# Patient Record
Sex: MALE | Race: WHITE | NOT HISPANIC OR LATINO | Employment: FULL TIME | URBAN - METROPOLITAN AREA
[De-identification: names, ages, dates, MRNs, and addresses within clinical notes are randomized per-mention and may not be internally consistent; named-entity substitution may affect disease eponyms.]

---

## 2017-10-12 ENCOUNTER — TRANSCRIBE ORDERS (OUTPATIENT)
Dept: ADMINISTRATIVE | Facility: HOSPITAL | Age: 37
End: 2017-10-12

## 2017-11-06 ENCOUNTER — LAB (OUTPATIENT)
Dept: LAB | Facility: HOSPITAL | Age: 37
End: 2017-11-06
Payer: COMMERCIAL

## 2017-11-06 ENCOUNTER — TRANSCRIBE ORDERS (OUTPATIENT)
Dept: LAB | Facility: HOSPITAL | Age: 37
End: 2017-11-06

## 2017-11-06 DIAGNOSIS — N46.9 MALE INFERTILITY: ICD-10-CM

## 2017-11-06 DIAGNOSIS — N46.9 MALE INFERTILITY: Primary | ICD-10-CM

## 2017-11-06 LAB
B ABORTUS AB SMN QL AGGL: SLIGHT
COLLECTION DATE & TIME: NORMAL
LIQUEFACTION TIME SMN: 20 MIN
PH SMN: 8.1 [PH] (ref 7.2–8.6)
SEX ABSTIN DURATION TIME PATIENT: NORMAL D
SPECIMEN VOL SMN: 2.4 ML (ref 1–5)
SPERM # SMN: 804 MILLION/EJACULATION (ref 40–1000)
SPERM AMORPHOUS HEAD NFR SMN: 1 %
SPERM MORPH PNL SMN: 20
SPERM MOTILE SMN QL MICRO: 85 %
SPERM MOTILITY SCORE SMN AUTO: 3 (ref 2–4)
SPERM PROG NFR SMN: 4 % (ref 2–4)
SPERM SMN: 0 %
SPERM SMN: 1 %
SPERM SMN: 18 % (ref 0–50)
SPERM SMN: 2 %
SPERM SMN: 3 %
SPERM SMN: 3 %
SPERM SMN: 335 /ML (ref 20–999)
SPERM SMN: 8 %
SPERM SMN: 82 % (ref 50–100)
VISC SMN: 4 CP (ref 3–4)
WBC SMN QL: <1 "HPF"

## 2017-11-06 PROCEDURE — 89320 SEMEN ANAL VOL/COUNT/MOT: CPT

## 2019-02-26 ENCOUNTER — TRANSCRIBE ORDERS (OUTPATIENT)
Dept: ADMINISTRATIVE | Facility: HOSPITAL | Age: 39
End: 2019-02-26

## 2019-02-26 DIAGNOSIS — J32.9 CHRONIC SINUSITIS, UNSPECIFIED LOCATION: Primary | ICD-10-CM

## 2019-03-06 ENCOUNTER — HOSPITAL ENCOUNTER (OUTPATIENT)
Dept: CT IMAGING | Facility: HOSPITAL | Age: 39
Discharge: HOME | End: 2019-03-06
Attending: OTOLARYNGOLOGY
Payer: COMMERCIAL

## 2019-03-06 DIAGNOSIS — J32.9 CHRONIC SINUSITIS, UNSPECIFIED LOCATION: ICD-10-CM

## 2019-03-06 PROCEDURE — 70486 CT MAXILLOFACIAL W/O DYE: CPT

## 2019-06-17 ENCOUNTER — APPOINTMENT (OUTPATIENT)
Dept: LAB | Facility: HOSPITAL | Age: 39
End: 2019-06-17
Attending: OTOLARYNGOLOGY
Payer: COMMERCIAL

## 2019-06-19 PROCEDURE — 88304 TISSUE EXAM BY PATHOLOGIST: CPT | Performed by: PATHOLOGY

## 2019-06-20 ENCOUNTER — LAB REQUISITION (OUTPATIENT)
Dept: LAB | Facility: HOSPITAL | Age: 39
End: 2019-06-20
Payer: COMMERCIAL

## 2019-06-20 DIAGNOSIS — J32.4 CHRONIC PANSINUSITIS: ICD-10-CM

## 2019-07-24 ENCOUNTER — TRANSCRIBE ORDERS (OUTPATIENT)
Dept: ADMINISTRATIVE | Facility: HOSPITAL | Age: 39
End: 2019-07-24

## 2019-07-24 DIAGNOSIS — R07.2 PRECORDIAL PAIN: Primary | ICD-10-CM

## 2019-07-31 ENCOUNTER — HOSPITAL ENCOUNTER (OUTPATIENT)
Dept: NON INVASIVE DIAGNOSTICS | Facility: CLINIC | Age: 39
Discharge: HOME | End: 2019-07-31
Payer: COMMERCIAL

## 2019-07-31 DIAGNOSIS — R07.2 PRECORDIAL PAIN: ICD-10-CM

## 2019-07-31 PROCEDURE — 93017 CV STRESS TEST TRACING ONLY: CPT

## 2019-08-01 PROCEDURE — 93016 CV STRESS TEST SUPVJ ONLY: CPT | Performed by: INTERNAL MEDICINE

## 2019-08-01 PROCEDURE — 93018 CV STRESS TEST I&R ONLY: CPT | Performed by: INTERNAL MEDICINE

## 2021-09-21 ENCOUNTER — APPOINTMENT (OUTPATIENT)
Dept: RADIOLOGY | Facility: CLINIC | Age: 41
End: 2021-09-21
Payer: COMMERCIAL

## 2021-09-21 VITALS
DIASTOLIC BLOOD PRESSURE: 84 MMHG | BODY MASS INDEX: 35.31 KG/M2 | SYSTOLIC BLOOD PRESSURE: 128 MMHG | HEIGHT: 75 IN | WEIGHT: 284 LBS

## 2021-09-21 DIAGNOSIS — M22.42 CHONDROMALACIA OF LEFT PATELLA: Primary | ICD-10-CM

## 2021-09-21 DIAGNOSIS — M25.562 LEFT KNEE PAIN, UNSPECIFIED CHRONICITY: ICD-10-CM

## 2021-09-21 PROCEDURE — 73564 X-RAY EXAM KNEE 4 OR MORE: CPT

## 2021-09-21 PROCEDURE — 99203 OFFICE O/P NEW LOW 30 MIN: CPT | Performed by: ORTHOPAEDIC SURGERY

## 2021-09-21 NOTE — ASSESSMENT & PLAN NOTE
Findings consistent with patellofemoral pain syndrome  Imaging and prognosis reviewed with patient  Discussed with patient he has mild arthritic changes under knee cap which are causing his symptoms, pain, popping, grinding  At this time no cortisone injection warranted  His main goal is to control the symptoms from worsening, avoid high impact activities, repetitive stairs, kneeling, crawling  Stationary bike, elliptical, pool recommended for low impact exercises  He can use OTC nsaids as needed for pain, OTC voltaren gel, joint supplements  If symptoms worsen call and we can offer cortisone injection, also discussed gel injections  All patient's questions were answered to his satisfaction  This note is created using dictation transcription  It may contain typographical errors, grammatical errors, improperly dictated words, background noise and other errors

## 2021-09-21 NOTE — PROGRESS NOTES
Assessment:     1  Chondromalacia of left patella        Plan:     Problem List Items Addressed This Visit        Musculoskeletal and Integument    Chondromalacia of left patella - Primary     Findings consistent with patellofemoral pain syndrome  Imaging and prognosis reviewed with patient  Discussed with patient he has mild arthritic changes under knee cap which are causing his symptoms, pain, popping, grinding  At this time no cortisone injection warranted  His main goal is to control the symptoms from worsening, avoid high impact activities, repetitive stairs, kneeling, crawling  Stationary bike, elliptical, pool recommended for low impact exercises  He can use OTC nsaids as needed for pain, OTC voltaren gel, joint supplements  If symptoms worsen call and we can offer cortisone injection, also discussed gel injections  All patient's questions were answered to his satisfaction  This note is created using dictation transcription  It may contain typographical errors, grammatical errors, improperly dictated words, background noise and other errors  Relevant Orders    XR knee 4+ vw left injury          Subjective:     Patient ID: Ritika Schneider is a 42 y o  male  Chief Complaint:  42 yr old male in for evaluation of left knee pain  History MCL injury 1998 and rehabbed it with no recurrent issues  For past 10-15 yrs on and off pain anteromedial  Started going up stairs, seen at [facility] and prescribed physical therapy, nsaids for pain  He didn't find therapy very beneficial  Now patient has pain going up and down stairs, start up pain, even prolonged walking or standing bothers him  General dull ache most days, has sense of knee giving out at times, denies locking or michael instability  Popping, grinding noted in knee  Use ibuprofen as needed  No new injury or trauma       Allergy:  No Known Allergies  Medications:  all current active meds have been reviewed  Past Medical History:  Past Medical History: Diagnosis Date    Dental disease      Past Surgical History:  Past Surgical History:   Procedure Laterality Date    ANKLE SURGERY Left 2005    EYE SURGERY       Family History:  Family History   Problem Relation Age of Onset    Liver cancer Father     Diabetes Paternal Grandmother     Colon cancer Paternal Uncle      Social History:  Social History     Substance and Sexual Activity   Alcohol Use Yes     Social History     Substance and Sexual Activity   Drug Use Not on file     Social History     Tobacco Use   Smoking Status Never Smoker   Smokeless Tobacco Never Used     Review of Systems   Constitutional: Negative for chills and fever  HENT: Negative for ear pain and sore throat  Eyes: Negative for pain and visual disturbance  Respiratory: Negative for cough and shortness of breath  Cardiovascular: Negative for chest pain and palpitations  Gastrointestinal: Negative for abdominal pain and vomiting  Genitourinary: Negative for dysuria and hematuria  Musculoskeletal: Positive for arthralgias (left knee)  Negative for back pain and joint swelling  Skin: Negative for color change and rash  Neurological: Negative for seizures and syncope  Psychiatric/Behavioral: Negative  All other systems reviewed and are negative  Objective:  BP Readings from Last 1 Encounters:   09/21/21 128/84      Wt Readings from Last 1 Encounters:   09/21/21 129 kg (284 lb)      BMI:   Estimated body mass index is 35 5 kg/m² as calculated from the following:    Height as of this encounter: 6' 3" (1 905 m)  Weight as of this encounter: 129 kg (284 lb)  BSA:   Estimated body surface area is 2 55 meters squared as calculated from the following:    Height as of this encounter: 6' 3" (1 905 m)  Weight as of this encounter: 129 kg (284 lb)  Physical Exam  Vitals and nursing note reviewed  Constitutional:       Appearance: Normal appearance  He is well-developed     HENT:      Head: Normocephalic and atraumatic  Right Ear: External ear normal       Left Ear: External ear normal    Eyes:      Extraocular Movements: Extraocular movements intact  Conjunctiva/sclera: Conjunctivae normal    Pulmonary:      Effort: Pulmonary effort is normal    Musculoskeletal:      Cervical back: Neck supple  Left knee: No effusion  Instability Tests: Medial Nelida test negative and lateral Nelida test negative  Skin:     General: Skin is warm  Neurological:      Mental Status: He is alert and oriented to person, place, and time  Psychiatric:         Mood and Affect: Mood normal          Behavior: Behavior normal        Left Knee Exam     Muscle Strength   The patient has normal left knee strength  Tenderness   The patient is experiencing tenderness in the medial joint line and medial retinaculum (medial facet )  Range of Motion   The patient has normal left knee ROM  Tests   Nelida:  Medial - negative Lateral - negative  Varus: negative Valgus: negative  Patellar apprehension: negative    Other   Erythema: absent  Scars: absent  Sensation: normal  Pulse: present  Swelling: none  Effusion: no effusion present    Comments:  Patellofemoral joint crepitation with motion             I have personally reviewed pertinent films in PACS and my interpretation is xr left knee demonstrates mild narrowing medial and patellofemoral compartments, small spurring       Scribe Attestation    I,:  Rachel Campa am acting as a scribe while in the presence of the attending physician :       I,:  Cely Blackmon MD personally performed the services described in this documentation    as scribed in my presence :